# Patient Record
Sex: MALE | Race: WHITE | NOT HISPANIC OR LATINO | URBAN - METROPOLITAN AREA
[De-identification: names, ages, dates, MRNs, and addresses within clinical notes are randomized per-mention and may not be internally consistent; named-entity substitution may affect disease eponyms.]

---

## 2017-05-19 ENCOUNTER — OUTPATIENT (OUTPATIENT)
Dept: OUTPATIENT SERVICES | Facility: HOSPITAL | Age: 62
LOS: 1 days | End: 2017-05-19
Payer: COMMERCIAL

## 2017-05-19 ENCOUNTER — APPOINTMENT (OUTPATIENT)
Dept: MRI IMAGING | Facility: CLINIC | Age: 62
End: 2017-05-19

## 2017-05-19 DIAGNOSIS — C61 MALIGNANT NEOPLASM OF PROSTATE: ICD-10-CM

## 2017-05-19 PROCEDURE — A9585: CPT

## 2017-05-19 PROCEDURE — 72197 MRI PELVIS W/O & W/DYE: CPT

## 2024-08-09 ENCOUNTER — APPOINTMENT (OUTPATIENT)
Dept: ORTHOPEDIC SURGERY | Facility: CLINIC | Age: 69
End: 2024-08-09

## 2024-08-09 PROBLEM — Z78.9 NO PERTINENT PAST MEDICAL HISTORY: Status: RESOLVED | Noted: 2024-08-09 | Resolved: 2024-08-09

## 2024-08-09 PROBLEM — M19.011 ARTHRITIS OF RIGHT GLENOHUMERAL JOINT: Status: ACTIVE | Noted: 2024-08-09

## 2024-08-09 PROCEDURE — 20610 DRAIN/INJ JOINT/BURSA W/O US: CPT | Mod: RT

## 2024-08-09 PROCEDURE — 99204 OFFICE O/P NEW MOD 45 MIN: CPT | Mod: 25

## 2024-08-09 NOTE — PHYSICAL EXAM
[5 ___] : forward flexion 5[unfilled]/5 [] : motor and sensory intact distally [Right] : right shoulder [Outside films reviewed] : Outside films reviewed [Glenohumeral arthritis] : Glenohumeral arthritis [[id number]] : active forward flexion 160 degrees

## 2024-08-09 NOTE — HISTORY OF PRESENT ILLNESS
[de-identified] : Patient presents for RT shoulder pain. Patient states he has been having pain for about 4 weeks with NKI. Patient is having pain on the front of his shoulder and radiates to the back. Patient states that he has known arthritis in multiple joints and most recently underwent corticosteroid injection in the right shoulder at [facility] about a month ago and is concerned about recurrent pain, as he usually has good pain control for about a year after the injection.  Patient is RHD.  He plays tennis and has pain with right arm movement.

## 2024-08-09 NOTE — PROCEDURE
[Large Joint Injection] : Large joint injection [Right] : of the right [Pain] : pain [Inflammation] : inflammation [Alcohol] : alcohol [Betadine] : betadine [Ethyl Chloride sprayed topically] : ethyl chloride sprayed topically [___ cc    6mg] :  Betamethasone (Celestone) ~Vcc of 6mg [___ cc    1%] : Lidocaine ~Vcc of 1%  [] : Patient tolerated procedure well [Call if redness, pain or fever occur] : call if redness, pain or fever occur [Apply ice for 15min out of every hour for the next 12-24 hours as tolerated] : apply ice for 15 minutes out of every hour for the next 12-24 hours as tolerated [Patient was advised to rest the joint(s) for ____ days] : patient was advised to rest the joint(s) for [unfilled] days [Previous OTC use and PT nontherapeutic] : patient has tried OTC's including aspirin, Ibuprofen, Aleve, etc or prescription NSAIDS, and/or exercises at home and/or physical therapy without satisfactory response [Patient had decreased mobility in the joint] : patient had decreased mobility in the joint [Risks, benefits, alternatives discussed / Verbal consent obtained] : the risks benefits, and alternatives have been discussed, and verbal consent was obtained [Glenohumeral Joint] : glenohumeral joint [X-ray evidence of Osteoarthritis on this or prior visit] : x-ray evidence of Osteoarthritis on this or prior visit [Sterile technique used] : sterile technique used

## 2024-08-09 NOTE — DISCUSSION/SUMMARY
[de-identified] : I reviewed patient's right shoulder radiographic report from [facility] and discussed his condition and treatment options.  He tolerated injection well.  Follow up next week regarding hip pain.  Patient voiced understanding and agreement with the plan.

## 2024-08-21 ENCOUNTER — APPOINTMENT (OUTPATIENT)
Dept: ORTHOPEDIC SURGERY | Facility: CLINIC | Age: 69
End: 2024-08-21